# Patient Record
Sex: FEMALE | Race: WHITE | NOT HISPANIC OR LATINO | Employment: OTHER | URBAN - METROPOLITAN AREA
[De-identification: names, ages, dates, MRNs, and addresses within clinical notes are randomized per-mention and may not be internally consistent; named-entity substitution may affect disease eponyms.]

---

## 2017-04-10 ENCOUNTER — TRANSCRIBE ORDERS (OUTPATIENT)
Dept: ADMINISTRATIVE | Facility: HOSPITAL | Age: 62
End: 2017-04-10

## 2017-04-10 ENCOUNTER — TRANSCRIBE ORDERS (OUTPATIENT)
Dept: ONCOLOGY | Facility: CLINIC | Age: 62
End: 2017-04-10

## 2017-04-10 DIAGNOSIS — Z12.31 VISIT FOR SCREENING MAMMOGRAM: Primary | ICD-10-CM

## 2017-04-25 ENCOUNTER — OFFICE VISIT (OUTPATIENT)
Dept: ONCOLOGY | Facility: CLINIC | Age: 62
End: 2017-04-25

## 2017-04-25 VITALS
HEART RATE: 83 BPM | HEIGHT: 64 IN | RESPIRATION RATE: 16 BRPM | DIASTOLIC BLOOD PRESSURE: 71 MMHG | BODY MASS INDEX: 37.05 KG/M2 | TEMPERATURE: 97.7 F | SYSTOLIC BLOOD PRESSURE: 147 MMHG | WEIGHT: 217 LBS

## 2017-04-25 DIAGNOSIS — C50.911 MALIGNANT NEOPLASM OF RIGHT FEMALE BREAST, UNSPECIFIED SITE OF BREAST: Primary | ICD-10-CM

## 2017-04-25 PROCEDURE — 99213 OFFICE O/P EST LOW 20 MIN: CPT | Performed by: INTERNAL MEDICINE

## 2017-04-25 RX ORDER — TERBINAFINE HYDROCHLORIDE 250 MG/1
250 TABLET ORAL DAILY
COMMUNITY
Start: 2017-02-24

## 2017-04-25 NOTE — PROGRESS NOTES
"      PROBLEM LIST:  1. Stage IIA ([id number]) high-grade invasive ductal carcinoma of the right  breast, estrogen receptor negative, progesterone receptor weakly positive,  HER-2 negative:  a) The patient presented with a palpable mass in the right breast in the upper  inner quadrant. She had previously been getting yearly mammograms but the tumor  was in a location that may have been missed by screening. On 10/22/2013 she had  a biopsy that showed a high-grade invasive ductal carcinoma that was estrogen  receptor negative and HER-2 negative with weak progesterone receptor positivity  in about 10% of cells.   b) On 11/27/2013 she had a right lumpectomy with sentinel lymph node biopsy by  Dr. Rashawn Ochoa. Pathology showed a 3.5 cm high-grade cancer with negative  margins. 0 of 3 sentinel lymph nodes had evidence of disease.   c) The patient completed 4 cycles of docetaxel and cyclophosphamide with the  last dose on 03/05/2014.   She tolerated the treatment with minimal side effects.  d) Adjuvant endocrine therapy was recommended but the patient declined this.   2. Hypertension - on lisinopril.     Subjective     HISTORY OF PRESENT ILLNESS:   Emily Mccullough returns for follow-up.   She says she is doing well.  Her knee pain is unchanged.  She had her mammogram in December and is scheduled for a repeat in July.  No complaints about her energy level.    Past Medical History, Past Surgical History, Social History, Family History have been reviewed and are without significant changes except as mentioned.    Review of Systems   A comprehensive 14 point review of systems was performed and was negative except as mentioned.    Medications:  The current medication list was reviewed in the EMR    ALLERGIES:  No Known Allergies    Objective      /71  Pulse 83  Temp 97.7 °F (36.5 °C) (Temporal Artery )   Resp 16  Ht 64\" (162.6 cm)  Wt 217 lb (98.4 kg)  LMP  (LMP Unknown)  BMI 37.25 kg/m2     Performance Status: " 0    General: well appearing female in no acute distress  Neuro: alert and oriented  HEENT: sclera anicteric, oropharynx clear  Lymphatics: no cervical, supraclavicular, or axillary adenopathy  Breast: The left breast is unremarkable. in the right breast she has a well-healed scar with scar tissue that measures about 5 cm across, unchanged  Cardiovascular: regular rate and rhythm, no murmurs  Lungs: clear to auscultation bilaterally  Abdomen: soft, nontender, nondistended.  No palpable organomegaly  Extremeties: no lower extremity edema  Skin: no rashes, lesions, bruising, or petechiae  Psych: mood and affect appropriate      Imaging: mammogram in July 2016 showed stable changes.      Assessment/Plan   Emily Mccullough is a 61 y.o. year old female with a history of stage II a ER negative, [place] positive, HER-2 negative breast cancer who is now over 2 years out from completion of treatment.  She is doing well without evidence of recurrence.  She is now about 3 years out from finishing her treatment.  We discussed that for her tumor I would expect that the highest risk of recurrence is in the first 5 years.  After that I will plan to follow her yearly.                  Visit time was 15 minutes, greater than 50% spent in counseling      Darcie Bai MD  [facility]    4/25/2017          CC:

## 2017-07-06 ENCOUNTER — HOSPITAL ENCOUNTER (OUTPATIENT)
Dept: MAMMOGRAPHY | Facility: HOSPITAL | Age: 62
Discharge: HOME | End: 2017-07-06
Attending: INTERNAL MEDICINE | Admitting: INTERNAL MEDICINE

## 2017-07-06 DIAGNOSIS — Z12.31 VISIT FOR SCREENING MAMMOGRAM: ICD-10-CM

## 2017-07-06 PROCEDURE — 77063 BREAST TOMOSYNTHESIS BI: CPT

## 2017-07-06 PROCEDURE — 77063 BREAST TOMOSYNTHESIS BI: CPT | Performed by: RADIOLOGY

## 2017-07-06 PROCEDURE — 77067 SCR MAMMO BI INCL CAD: CPT | Performed by: RADIOLOGY

## 2017-07-06 PROCEDURE — G0202 SCR MAMMO BI INCL CAD: HCPCS

## 2017-10-24 ENCOUNTER — OFFICE VISIT (OUTPATIENT)
Dept: ONCOLOGY | Facility: CLINIC | Age: 62
End: 2017-10-24

## 2017-10-24 VITALS
RESPIRATION RATE: 16 BRPM | HEART RATE: 87 BPM | HEIGHT: 64 IN | WEIGHT: 204 LBS | TEMPERATURE: 97 F | SYSTOLIC BLOOD PRESSURE: 135 MMHG | BODY MASS INDEX: 34.83 KG/M2 | DIASTOLIC BLOOD PRESSURE: 65 MMHG

## 2017-10-24 DIAGNOSIS — C50.911 MALIGNANT NEOPLASM OF RIGHT BREAST IN FEMALE, ESTROGEN RECEPTOR NEGATIVE, UNSPECIFIED SITE OF BREAST (HCC): Primary | ICD-10-CM

## 2017-10-24 DIAGNOSIS — Z17.1 MALIGNANT NEOPLASM OF RIGHT BREAST IN FEMALE, ESTROGEN RECEPTOR NEGATIVE, UNSPECIFIED SITE OF BREAST (HCC): Primary | ICD-10-CM

## 2017-10-24 PROCEDURE — 99213 OFFICE O/P EST LOW 20 MIN: CPT | Performed by: INTERNAL MEDICINE

## 2017-10-24 NOTE — PROGRESS NOTES
"      PROBLEM LIST:  1. Stage IIA ([id number]) high-grade invasive ductal carcinoma of the right  breast, estrogen receptor negative, progesterone receptor weakly positive,  HER-2 negative:  a) The patient presented with a palpable mass in the right breast in the upper  inner quadrant. She had previously been getting yearly mammograms but the tumor  was in a location that may have been missed by screening. On 10/22/2013 she had  a biopsy that showed a high-grade invasive ductal carcinoma that was estrogen  receptor negative and HER-2 negative with weak progesterone receptor positivity  in about 10% of cells.   b) On 11/27/2013 she had a right lumpectomy with sentinel lymph node biopsy by  Dr. Rashawn Ochoa. Pathology showed a 3.5 cm high-grade cancer with negative  margins. 0 of 3 sentinel lymph nodes had evidence of disease.   c) The patient completed 4 cycles of docetaxel and cyclophosphamide with the  last dose on 03/05/2014.   She tolerated the treatment with minimal side effects.  d) Adjuvant endocrine therapy was recommended but the patient declined this.   2. Hypertension - on lisinopril.     Subjective     HISTORY OF PRESENT ILLNESS:   Emily Mccullough returns for follow-up.   She retired this summer.  Since then she has lost about 20 pounds.  Her energy level is great and she is going to the gym for about 2 hours every day.  She says she just decided she needed to take better care of herself.    Past Medical History, Past Surgical History, Social History, Family History have been reviewed and are without significant changes except as mentioned.    Review of Systems   A comprehensive 14 point review of systems was performed and was negative except as mentioned.    Medications:  The current medication list was reviewed in the EMR    ALLERGIES:  No Known Allergies    Objective      /65 Comment: LUE  Pulse 87  Temp 97 °F (36.1 °C) (Temporal Artery )   Resp 16  Ht 64\" (162.6 cm)  Wt 204 lb (92.5 kg)  LMP  " (LMP Unknown) Comment: 2013  BMI 35.02 kg/m2     Performance Status: 0    General: well appearing female in no acute distress  Neuro: alert and oriented  HEENT: sclera anicteric, oropharynx clear  Lymphatics: no cervical, supraclavicular, or axillary adenopathy  Breast: The left breast is unremarkable. in the right breast she has a well-healed scar with scar tissue that has softened considerably.  No palpable mass  Cardiovascular: regular rate and rhythm, no murmurs  Lungs: clear to auscultation bilaterally  Abdomen: soft, nontender, nondistended.  No palpable organomegaly  Extremeties: no lower extremity edema  Skin: no rashes, lesions, bruising, or petechiae  Psych: mood and affect appropriate      Imaging: mammogram in July 2017 showed stable changes.  MRI was recommended due to the difficult to image area of scar as well as high risk screening indication      Assessment/Plan   Emily Mccullough is a 62 y.o. year old female with a history of stage II a ER negative, [place] positive, HER-2 negative breast cancer who returns for follow up.  She is doing well without evidence of recurrence, now 4 years from her diagnosis.    We discussed MRI imaging of the breast which she is agreeable to.  We will schedule this for January about 6 months from her mammogram.    I'll plan to see her back again in 6 months.                Visit time was 15 minutes, greater than 50% spent in counseling      Darcie Bai MD  [facility] Hematology and Oncology    10/24/2017          CC:

## 2018-01-19 ENCOUNTER — HOSPITAL ENCOUNTER (OUTPATIENT)
Dept: MRI IMAGING | Facility: HOSPITAL | Age: 63
Discharge: HOME | End: 2018-01-19
Attending: INTERNAL MEDICINE | Admitting: INTERNAL MEDICINE

## 2018-01-19 DIAGNOSIS — Z17.1 MALIGNANT NEOPLASM OF RIGHT BREAST IN FEMALE, ESTROGEN RECEPTOR NEGATIVE, UNSPECIFIED SITE OF BREAST (HCC): ICD-10-CM

## 2018-01-19 DIAGNOSIS — C50.911 MALIGNANT NEOPLASM OF RIGHT BREAST IN FEMALE, ESTROGEN RECEPTOR NEGATIVE, UNSPECIFIED SITE OF BREAST (HCC): ICD-10-CM

## 2018-01-19 LAB — CREAT BLDA-MCNC: 0.7 MG/DL (ref 0.6–1.3)

## 2018-01-19 PROCEDURE — A9577 INJ MULTIHANCE: HCPCS | Performed by: INTERNAL MEDICINE

## 2018-01-19 PROCEDURE — 0159T MRI BREAST BILATERAL W WO CONTRAST: CPT | Performed by: RADIOLOGY

## 2018-01-19 PROCEDURE — 0159T HC MRI BREAST COMPUTER ANALYSIS: CPT

## 2018-01-19 PROCEDURE — 0 GADOBENATE DIMEGLUMINE 529 MG/ML SOLUTION: Performed by: INTERNAL MEDICINE

## 2018-01-19 PROCEDURE — 82565 ASSAY OF CREATININE: CPT

## 2018-01-19 PROCEDURE — C8908 MRI W/O FOL W/CONT, BREAST,: HCPCS

## 2018-01-19 PROCEDURE — 77059 MRI BREAST BILATERAL W WO CONTRAST: CPT | Performed by: RADIOLOGY

## 2018-01-19 RX ADMIN — GADOBENATE DIMEGLUMINE 19 ML: 529 INJECTION, SOLUTION INTRAVENOUS at 12:00

## 2018-01-23 ENCOUNTER — TELEPHONE (OUTPATIENT)
Dept: MRI IMAGING | Facility: HOSPITAL | Age: 63
End: 2018-01-23

## 2018-01-23 NOTE — TELEPHONE ENCOUNTER
Called pt with MRI results.  Pt is being recalled for a 2nd look US scheduled for 2/1/18 @ 10:00 to arrive 20 minutes early. She was concerned that it is so far away and I told her if we could reschedule to a closer date, I would call her. Pt expressed verbal understanding and will call with any further questions or concerns.  An E-Mail was sent to Dr. Yin to see if she had any earlier appointments available.

## 2018-01-29 ENCOUNTER — HOSPITAL ENCOUNTER (OUTPATIENT)
Dept: ULTRASOUND IMAGING | Facility: HOSPITAL | Age: 63
Discharge: HOME | End: 2018-01-29
Attending: INTERNAL MEDICINE | Admitting: INTERNAL MEDICINE

## 2018-01-29 ENCOUNTER — HOSPITAL ENCOUNTER (OUTPATIENT)
Dept: MAMMOGRAPHY | Facility: HOSPITAL | Age: 63
Discharge: HOME | End: 2018-01-29

## 2018-01-29 ENCOUNTER — HOSPITAL ENCOUNTER (OUTPATIENT)
Dept: ULTRASOUND IMAGING | Facility: HOSPITAL | Age: 63
Discharge: HOME | End: 2018-01-29

## 2018-01-29 DIAGNOSIS — R92.8 ABNORMAL FINDINGS ON DIAGNOSTIC IMAGING OF BREAST: ICD-10-CM

## 2018-01-29 DIAGNOSIS — R92.8 ABNORMAL MAMMOGRAM: ICD-10-CM

## 2018-01-29 PROCEDURE — A4648 IMPLANTABLE TISSUE MARKER: HCPCS

## 2018-01-29 PROCEDURE — 88173 CYTOPATH EVAL FNA REPORT: CPT | Performed by: INTERNAL MEDICINE

## 2018-01-29 PROCEDURE — 10022 US GUIDED FINE NEEDLE ASPIRATION: CPT | Performed by: RADIOLOGY

## 2018-01-29 PROCEDURE — 88305 TISSUE EXAM BY PATHOLOGIST: CPT | Performed by: INTERNAL MEDICINE

## 2018-01-29 PROCEDURE — 76942 ECHO GUIDE FOR BIOPSY: CPT | Performed by: RADIOLOGY

## 2018-01-29 PROCEDURE — 76942 ECHO GUIDE FOR BIOPSY: CPT

## 2018-01-29 PROCEDURE — 77065 DX MAMMO INCL CAD UNI: CPT | Performed by: RADIOLOGY

## 2018-01-29 PROCEDURE — 19083 BX BREAST 1ST LESION US IMAG: CPT | Performed by: RADIOLOGY

## 2018-01-29 RX ORDER — LIDOCAINE HYDROCHLORIDE 10 MG/ML
5 INJECTION, SOLUTION INFILTRATION; PERINEURAL ONCE
Status: COMPLETED | OUTPATIENT
Start: 2018-01-29 | End: 2018-01-29

## 2018-01-29 RX ORDER — LIDOCAINE HYDROCHLORIDE AND EPINEPHRINE 10; 10 MG/ML; UG/ML
10 INJECTION, SOLUTION INFILTRATION; PERINEURAL ONCE
Status: COMPLETED | OUTPATIENT
Start: 2018-01-29 | End: 2018-01-29

## 2018-01-29 RX ORDER — LIDOCAINE HYDROCHLORIDE AND EPINEPHRINE 10; 10 MG/ML; UG/ML
10 INJECTION, SOLUTION INFILTRATION; PERINEURAL ONCE
Status: SHIPPED | OUTPATIENT
Start: 2018-01-29

## 2018-01-29 RX ADMIN — LIDOCAINE HYDROCHLORIDE,EPINEPHRINE BITARTRATE 3 ML: 10; .01 INJECTION, SOLUTION INFILTRATION; PERINEURAL at 12:32

## 2018-01-29 RX ADMIN — LIDOCAINE HYDROCHLORIDE 5 ML: 10 INJECTION, SOLUTION INFILTRATION; PERINEURAL at 12:32

## 2018-01-29 RX ADMIN — LIDOCAINE HYDROCHLORIDE 5 ML: 10 INJECTION, SOLUTION INFILTRATION; PERINEURAL at 12:31

## 2018-01-29 NOTE — PROGRESS NOTES
Alert and orientated. Denies discomfort. No active bleeding. Steri-strips & gauze dressing applied.  Ice packs given. Verbalizes and demonstrates understanding of post-care instructions and written copy given.

## 2018-01-31 ENCOUNTER — APPOINTMENT (OUTPATIENT)
Dept: ULTRASOUND IMAGING | Facility: HOSPITAL | Age: 63
End: 2018-01-31
Attending: INTERNAL MEDICINE

## 2018-01-31 LAB
LAB AP CASE REPORT: NORMAL
Lab: NORMAL
PATH REPORT.FINAL DX SPEC: NORMAL

## 2018-02-01 ENCOUNTER — HOSPITAL ENCOUNTER (OUTPATIENT)
Dept: ULTRASOUND IMAGING | Facility: HOSPITAL | Age: 63
End: 2018-02-01
Attending: INTERNAL MEDICINE

## 2018-02-05 ENCOUNTER — TELEPHONE (OUTPATIENT)
Dept: MAMMOGRAPHY | Facility: HOSPITAL | Age: 63
End: 2018-02-05

## 2018-02-05 NOTE — TELEPHONE ENCOUNTER
02.05.18 @ 1445: Pt notified of pathology results and recommendations. Verbalizes understanding. Denies discomfort. Denies any signs and symptoms of infection.  At Dr NASIM Yin's request, the  importance of getting the MRI was stressed as it is the only way to see the lumpectomy bed. Verbalizes understanding.

## 2018-02-22 LAB
LAB AP CASE REPORT: NORMAL
LAB AP CLINICAL INFORMATION: NORMAL
LAB AP DIAGNOSIS COMMENT: NORMAL
Lab: NORMAL
PATH REPORT.ADDENDUM SPEC: NORMAL
PATH REPORT.FINAL DX SPEC: NORMAL
PATH REPORT.GROSS SPEC: NORMAL

## 2018-05-25 NOTE — PROGRESS NOTES
PROBLEM LIST:  1. Stage IIA ([id number]) high-grade invasive ductal carcinoma of the right  breast, estrogen receptor negative, progesterone receptor weakly positive,  HER-2 negative:  a) The patient presented with a palpable mass in the right breast in the upper  inner quadrant. She had previously been getting yearly mammograms but the tumor  was in a location that may have been missed by screening. On 10/22/2013 she had  a biopsy that showed a high-grade invasive ductal carcinoma that was estrogen  receptor negative and HER-2 negative with weak progesterone receptor positivity  in about 10% of cells.   b) On 11/27/2013 she had a right lumpectomy with sentinel lymph node biopsy by  Dr. Rashawn Ochoa. Pathology showed a 3.5 cm high-grade cancer with negative  margins. 0 of 3 sentinel lymph nodes had evidence of disease.   c) The patient completed 4 cycles of docetaxel and cyclophosphamide with the  last dose on 03/05/2014.   She tolerated the treatment with minimal side effects.  d) Adjuvant endocrine therapy was recommended but the patient declined this.   2. Hypertension - on lisinopril.     Subjective     HISTORY OF PRESENT ILLNESS:   Emily Mccullough returns for follow-up.   In January she had a breast MRI which was followed by a biopsy.   This turned out to be fat necrosis.  The whole experience was very troubling for her.  She was concerned that her mammograms stable for almost 5 years now, and an MRI seemed to be a regression.  She is not sure she wants to continue doing screening MRIs, at least not every year.  Otherwise she is feeling well.  She has been able to keep off much of the weight that she has lost and no longer has a diagnosis of diabetes.  She traveled to [place] to visit her family and friends earlier this year.    Past Medical History, Past Surgical History, Social History, Family History have been reviewed and are without significant changes except as mentioned.    Review of Systems   A  "comprehensive 14 point review of systems was performed and was negative except as mentioned.    Medications:  The current medication list was reviewed in the EMR    ALLERGIES:  No Known Allergies    Objective      /65   Pulse 73   Temp 98.5 °F (36.9 °C) (Temporal Artery )   Resp 16   Ht 162.6 cm (64.02\")   Wt 88 kg (194 lb)   LMP  (LMP Unknown) Comment: 2013  SpO2 99%   BMI 33.28 kg/m²      Performance Status: 0    General: well appearing female in no acute distress  Neuro: alert and oriented  HEENT: sclera anicteric, oropharynx clear  Lymphatics: no cervical, supraclavicular, or axillary adenopathy  Breast: The left breast is unremarkable. in the right breast she has a well-healed scar with scar tissue.  No palpable mass  Cardiovascular: regular rate and rhythm, no murmurs  Lungs: clear to auscultation bilaterally  Abdomen: soft, nontender, nondistended.  No palpable organomegaly  Extremeties: no lower extremity edema  Skin: no rashes, lesions, bruising, or petechiae  Psych: mood and affect appropriate            Assessment/Plan   Emily Mccullough is a 62 y.o. year old female with a history of stage II a ER negative, ID positive, HER-2 negative breast cancer who returns for follow up.  She continues to do well without evidence of cancer recurrence.    We discussed that we had ordered the MRI last year because the radiologist who read her last mammogram suggested it given the posterior location of her lumpectomy bed.  The MRI can provide better imaging of the chest wall.  We also discussed that the first MRI is likely to show things that can lead to biopsy, simply because there are no previous comparisons.  However if she is more comfortable continuing with mammogram screening only adding this is reasonable.  She says she will plan to do another mammogram before the end of the year but does not plan to do an MRI for now.    I'll plan to see her back again in 6 months.  At that point she will be about 5 " years from her diagnosis.  We will discuss the option of continuing follow-up with her primary care only.                Visit time was 25 minutes, greater than 50% spent in counseling      Darcie Bai MD  [facility] Hematology and Oncology    5/29/2018          CC:

## 2018-05-29 ENCOUNTER — OFFICE VISIT (OUTPATIENT)
Dept: ONCOLOGY | Facility: CLINIC | Age: 63
End: 2018-05-29

## 2018-05-29 VITALS
HEIGHT: 64 IN | TEMPERATURE: 98.5 F | DIASTOLIC BLOOD PRESSURE: 65 MMHG | SYSTOLIC BLOOD PRESSURE: 136 MMHG | HEART RATE: 73 BPM | RESPIRATION RATE: 16 BRPM | BODY MASS INDEX: 33.12 KG/M2 | OXYGEN SATURATION: 99 % | WEIGHT: 194 LBS

## 2018-05-29 DIAGNOSIS — C50.911 MALIGNANT NEOPLASM OF RIGHT BREAST IN FEMALE, ESTROGEN RECEPTOR NEGATIVE, UNSPECIFIED SITE OF BREAST (HCC): Primary | ICD-10-CM

## 2018-05-29 DIAGNOSIS — Z17.1 MALIGNANT NEOPLASM OF RIGHT BREAST IN FEMALE, ESTROGEN RECEPTOR NEGATIVE, UNSPECIFIED SITE OF BREAST (HCC): Primary | ICD-10-CM

## 2018-05-29 PROCEDURE — 99214 OFFICE O/P EST MOD 30 MIN: CPT | Performed by: INTERNAL MEDICINE

## 2018-06-12 ENCOUNTER — TRANSCRIBE ORDERS (OUTPATIENT)
Dept: ADMINISTRATIVE | Facility: HOSPITAL | Age: 63
End: 2018-06-12

## 2018-06-12 DIAGNOSIS — R92.8 ABNORMAL MAMMOGRAM: Primary | ICD-10-CM

## 2018-11-27 ENCOUNTER — HOSPITAL ENCOUNTER (OUTPATIENT)
Dept: MAMMOGRAPHY | Facility: HOSPITAL | Age: 63
Discharge: HOME | End: 2018-11-27
Attending: INTERNAL MEDICINE | Admitting: INTERNAL MEDICINE

## 2018-11-27 DIAGNOSIS — R92.8 ABNORMAL MAMMOGRAM: ICD-10-CM

## 2018-11-27 PROCEDURE — 77066 DX MAMMO INCL CAD BI: CPT | Performed by: RADIOLOGY

## 2018-11-27 PROCEDURE — 77066 DX MAMMO INCL CAD BI: CPT

## 2018-11-27 PROCEDURE — G0279 TOMOSYNTHESIS, MAMMO: HCPCS

## 2018-11-27 PROCEDURE — 77062 BREAST TOMOSYNTHESIS BI: CPT | Performed by: RADIOLOGY

## 2018-12-04 ENCOUNTER — OFFICE VISIT (OUTPATIENT)
Dept: ONCOLOGY | Facility: CLINIC | Age: 63
End: 2018-12-04

## 2018-12-04 VITALS
OXYGEN SATURATION: 98 % | BODY MASS INDEX: 35.85 KG/M2 | SYSTOLIC BLOOD PRESSURE: 133 MMHG | HEART RATE: 88 BPM | WEIGHT: 210 LBS | HEIGHT: 64 IN | DIASTOLIC BLOOD PRESSURE: 60 MMHG | TEMPERATURE: 97.3 F | RESPIRATION RATE: 16 BRPM

## 2018-12-04 DIAGNOSIS — C50.911 MALIGNANT NEOPLASM OF RIGHT BREAST IN FEMALE, ESTROGEN RECEPTOR NEGATIVE, UNSPECIFIED SITE OF BREAST (HCC): Primary | ICD-10-CM

## 2018-12-04 DIAGNOSIS — Z17.1 MALIGNANT NEOPLASM OF RIGHT BREAST IN FEMALE, ESTROGEN RECEPTOR NEGATIVE, UNSPECIFIED SITE OF BREAST (HCC): Primary | ICD-10-CM

## 2018-12-04 PROCEDURE — 99213 OFFICE O/P EST LOW 20 MIN: CPT | Performed by: INTERNAL MEDICINE

## 2018-12-04 NOTE — PROGRESS NOTES
"      PROBLEM LIST:  1. Stage IIA ([id number]) high-grade invasive ductal carcinoma of the right  breast, estrogen receptor negative, progesterone receptor weakly positive,  HER-2 negative:  a) The patient presented with a palpable mass in the right breast in the upper  inner quadrant. She had previously been getting yearly mammograms but the tumor  was in a location that may have been missed by screening. On 10/22/2013 she had  a biopsy that showed a high-grade invasive ductal carcinoma that was estrogen  receptor negative and HER-2 negative with weak progesterone receptor positivity  in about 10% of cells.   b) On 11/27/2013 she had a right lumpectomy with sentinel lymph node biopsy by  Dr. Rashawn Ochoa. Pathology showed a 3.5 cm high-grade cancer with negative  margins. 0 of 3 sentinel lymph nodes had evidence of disease.   c) The patient completed 4 cycles of docetaxel and cyclophosphamide with the  last dose on 03/05/2014.   She tolerated the treatment with minimal side effects.  d) Adjuvant endocrine therapy was recommended but the patient declined this.   2. Hypertension - on lisinopril.     Subjective     HISTORY OF PRESENT ILLNESS:   Emily Mccullough returns for follow-up.   She had a mammogram in November which was stable - one year follow-up was recommended.  She says she's been feeling well.  Her energy level is good.  She has no new complaints today.    Past Medical History, Past Surgical History, Social History, Family History have been reviewed and are without significant changes except as mentioned.    Review of Systems   A comprehensive 14 point review of systems was performed and was negative except as mentioned.    Medications:  The current medication list was reviewed in the EMR    ALLERGIES:  No Known Allergies    Objective      /60 Comment: LUE  Pulse 88   Temp 97.3 °F (36.3 °C) (Temporal)   Resp 16   Ht 162.6 cm (64\")   Wt 95.3 kg (210 lb)   LMP  (LMP Unknown) Comment: 2013  SpO2 98% " Comment: RA  BMI 36.05 kg/m²      Performance Status: 0    General: well appearing female in no acute distress  Neuro: alert and oriented  HEENT: sclera anicteric, oropharynx clear  Lymphatics: no cervical, supraclavicular, or axillary adenopathy  Breast: The left breast is unremarkable. in the right breast she has a well-healed scar with scar tissue.  No palpable mass  Cardiovascular: regular rate and rhythm, no murmurs  Lungs: clear to auscultation bilaterally  Abdomen: soft, nontender, nondistended.  No palpable organomegaly  Extremeties: no lower extremity edema  Skin: no rashes, lesions, bruising, or petechiae  Psych: mood and affect appropriate      Mammogram 11/27/18 - category 2.  6 mo f/u recommended.      Assessment/Plan   Emily Mccullough is a 63 y.o. year old female with a history of stage II a ER negative, [place] positive, HER-2 negative breast cancer who returns for follow up.  She continues to do well without evidence of cancer recurrence.  At this point she is over 5 years out from her diagnosis and surgery.  We discussed that for this type of breast cancer the likelihood of recurrence after 5 years is extremely small.  She has the option of following up with her primary care doctor going forward.  Of course I am available if anything ever does come up in the future.  I encouraged her to continue with her follow-up mammograms.  She is undecided about doing screening breast MRI.  I told her that there is no clear evidence that it reduces breast cancer mortality, but for dense breast tissue it can be helpful in getting a more clear image in combination with mammogram.  She will plan to follow-up with Dr. Mg in the future.                  Visit time was 15 minutes, greater than 50% spent in counseling on imaging results, prognosis, and follow-up plans.    Darcie Bai MD  [facility]ematology and Oncology    12/4/2018          CC:

## 2019-11-25 ENCOUNTER — TRANSCRIBE ORDERS (OUTPATIENT)
Dept: ADMINISTRATIVE | Facility: HOSPITAL | Age: 64
End: 2019-11-25

## 2019-11-25 DIAGNOSIS — Z12.31 VISIT FOR SCREENING MAMMOGRAM: Primary | ICD-10-CM

## 2020-02-05 ENCOUNTER — HOSPITAL ENCOUNTER (OUTPATIENT)
Dept: MAMMOGRAPHY | Facility: HOSPITAL | Age: 65
Discharge: HOME | End: 2020-02-05
Admitting: FAMILY MEDICINE

## 2020-02-05 DIAGNOSIS — Z12.31 VISIT FOR SCREENING MAMMOGRAM: ICD-10-CM

## 2020-02-05 PROCEDURE — 77063 BREAST TOMOSYNTHESIS BI: CPT

## 2020-02-05 PROCEDURE — 77067 SCR MAMMO BI INCL CAD: CPT | Performed by: RADIOLOGY

## 2020-02-05 PROCEDURE — 77063 BREAST TOMOSYNTHESIS BI: CPT | Performed by: RADIOLOGY

## 2020-02-05 PROCEDURE — 77067 SCR MAMMO BI INCL CAD: CPT

## 2020-09-15 ENCOUNTER — TRANSCRIBE ORDERS (OUTPATIENT)
Dept: ADMINISTRATIVE | Facility: HOSPITAL | Age: 65
End: 2020-09-15

## 2020-09-15 DIAGNOSIS — Z12.31 VISIT FOR SCREENING MAMMOGRAM: Primary | ICD-10-CM

## 2021-02-08 ENCOUNTER — HOSPITAL ENCOUNTER (OUTPATIENT)
Dept: MAMMOGRAPHY | Facility: HOSPITAL | Age: 66
Discharge: HOME | End: 2021-02-08
Admitting: FAMILY MEDICINE

## 2021-02-08 DIAGNOSIS — Z12.31 VISIT FOR SCREENING MAMMOGRAM: ICD-10-CM

## 2021-02-08 PROCEDURE — 77063 BREAST TOMOSYNTHESIS BI: CPT

## 2021-02-08 PROCEDURE — 77067 SCR MAMMO BI INCL CAD: CPT

## 2021-02-08 PROCEDURE — 77063 BREAST TOMOSYNTHESIS BI: CPT | Performed by: RADIOLOGY

## 2021-02-08 PROCEDURE — 77067 SCR MAMMO BI INCL CAD: CPT | Performed by: RADIOLOGY

## 2021-02-24 ENCOUNTER — HOSPITAL ENCOUNTER (OUTPATIENT)
Dept: MAMMOGRAPHY | Facility: HOSPITAL | Age: 66
Discharge: HOME | End: 2021-02-24

## 2021-02-24 DIAGNOSIS — Z12.31 VISIT FOR SCREENING MAMMOGRAM: ICD-10-CM

## 2021-03-02 ENCOUNTER — OFFICE VISIT (OUTPATIENT)
Dept: OBSTETRICS AND GYNECOLOGY | Facility: CLINIC | Age: 66
End: 2021-03-02

## 2021-03-02 VITALS
SYSTOLIC BLOOD PRESSURE: 148 MMHG | WEIGHT: 207 LBS | DIASTOLIC BLOOD PRESSURE: 76 MMHG | HEIGHT: 63 IN | BODY MASS INDEX: 36.68 KG/M2

## 2021-03-02 DIAGNOSIS — N95.1 MENOPAUSAL SYMPTOMS: ICD-10-CM

## 2021-03-02 DIAGNOSIS — Z17.1 MALIGNANT NEOPLASM OF RIGHT BREAST IN FEMALE, ESTROGEN RECEPTOR NEGATIVE, UNSPECIFIED SITE OF BREAST (HCC): ICD-10-CM

## 2021-03-02 DIAGNOSIS — Z01.419 PAP TEST, AS PART OF ROUTINE GYNECOLOGICAL EXAMINATION: Primary | ICD-10-CM

## 2021-03-02 DIAGNOSIS — C50.911 MALIGNANT NEOPLASM OF RIGHT BREAST IN FEMALE, ESTROGEN RECEPTOR NEGATIVE, UNSPECIFIED SITE OF BREAST (HCC): ICD-10-CM

## 2021-03-02 PROCEDURE — 99396 PREV VISIT EST AGE 40-64: CPT | Performed by: OBSTETRICS & GYNECOLOGY

## 2021-03-02 RX ORDER — TRIAMCINOLONE ACETONIDE 1 MG/G
CREAM TOPICAL 2 TIMES DAILY
COMMUNITY

## 2021-03-02 RX ORDER — ESCITALOPRAM OXALATE 5 MG/1
TABLET ORAL
COMMUNITY
Start: 2020-12-18

## 2021-03-02 NOTE — PROGRESS NOTES
GYN Annual Exam     CC - Here for annual exam.     Subjective   HPI  Emily Mccullough is a 65 y.o. female, , who presents for annual well woman exam.  She is postmenopausal. Her last LMP was No LMP recorded (lmp unknown). Patient is postmenopausal..  Periods are none, lasting 0 days.  Dysmenorrhea:none.  Patient reports problems with: none.  Partner Status: Marital Status: .    Desires STD Screening: YES/NO/Other: no.    Patients  passed away last summer.     Additional OB/GYN History   Current contraception: postmenopausal    Last Pap :   Last Completed Pap Smear       Status Date      PAP SMEAR Done 2/10/2020 negative        History of abnormal Pap smear: yes  Family history of uterine, colon, breast, or ovarian cancer: no  Last mammogram:   Last Completed Mammogram       Status Date      MAMMOGRAM Done 2021 MAMMO SCREENING TECHNICAL RECALL RIGHT     Patient has more history with this topic...        Last colonoscopy:   Last Completed Colonoscopy       Status Date      COLONOSCOPY No completions recorded        Last DEXA: none   Exercises Regularly: no  Feelings of Anxiety or Depression: yes    Tobacco Usage?:     Health Maintenance   Topic Date Due   • Annual Gynecologic Pelvic and Breast Exam  1955   • COLONOSCOPY  1957   • ANNUAL PHYSICAL  1960   • ZOSTER VACCINE (1 of 2) 2007   • HEPATITIS C SCREENING  2017   • INFLUENZA VACCINE  2020   • MAMMOGRAM  2022   • PAP SMEAR  02/10/2023   • TDAP/TD VACCINES (2 - Td) 2027   • Pneumococcal Vaccine 0-64  Aged Out   • MENINGOCOCCAL VACCINE  Aged Out       Current Outpatient Medications   Medication Sig Dispense Refill   • escitalopram (LEXAPRO) 5 MG tablet      • lisinopril-hydrochlorothiazide (PRINZIDE,ZESTORETIC) 20-12.5 MG per tablet Take 1 tablet by mouth Daily.     • metFORMIN XR (GLUCOPHAGE-XR) 500 MG 24 hr tablet Take 1 tablet by mouth 2 (Two) Times a Day.     • triamcinolone (KENALOG) 0.1 %  "cream Apply  topically to the appropriate area as directed 2 (Two) Times a Day.     • terbinafine (lamiSIL) 250 MG tablet Take 250 mg by mouth Daily.       Current Facility-Administered Medications   Medication Dose Route Frequency Provider Last Rate Last Admin   • lidocaine-EPINEPHrine (XYLOCAINE W/EPI) 1 %-1:[id number] injection 10 mL  10 mL Infiltration Once Darcie Bai MD           The additional following portions of the patient's history were reviewed and updated as appropriate: allergies, current medications, past family history, past medical history, past social history and past surgical history.    Review of Systems   Constitutional: Negative.    HENT: Negative.    Respiratory: Negative.    Cardiovascular: Negative.    Gastrointestinal: Negative.    Genitourinary: Negative.    Musculoskeletal: Negative.    Skin: Negative.    Allergic/Immunologic: Negative.    Neurological: Negative.    Hematological: Negative.    Psychiatric/Behavioral: Negative.        I have reviewed and agree with the HPI, ROS, and historical information as entered above. Yash Baker MD    Objective   /76   Ht 160 cm (63\")   Wt 93.9 kg (207 lb)   LMP  (LMP Unknown) Comment: 2013  BMI 36.67 kg/m²     PE    Breast: Without masses ,nontender, no skin changes or retractions  Axilla: Normal, no lymphadenopathy  Heart: Regular rate no murmurs rubs or gallops  Lungs: Clear to auscultation, normal breath sounds bilaterally  Abdomen: Soft nontender, no hepatosplenomegaly, no guarding or rebound, no masses  Pelvic exam  External genitalia: Normal introitus and vulva  Vagina: Normal mucosa no bleeding inflammation or discharge  Bladder: Normal position nontender  Urethral meatus and urethra: Normal nontender  Cervix: No lesions, no discharge, bleeding or inflammation  Bimanual: Nontender adnexa clear, no sign of uterine or ovarian enlargement  Anal: No external lesions or hemorrhoids    Rectovaginal: Declined by " patient    Assessment/Plan     Assessment     Problem List Items Addressed This Visit        Genitourinary and Reproductive     Menopausal symptoms       Hematology and Neoplasia    Breast cancer, right (CMS/HCC)    Overview                Other Visit Diagnoses     Pap test, as part of routine gynecological examination    -  Primary    Relevant Orders    Pap IG, Rfx HPV ASCU          1. GYN annual well woman exam.   2. Normal postmenopausal exam, patient's  passed away this year, patient had questions about possible HRT    Plan     1. Reviewed HPV guidelines  2. Reviewed monthly self breast exams.  Instructed to call with lumps, pain, or breast discharge.  Yearly mammograms ordered.  3. Recommended use of Vitamin D and getting adequate calcium in her diet. (1500mg)  4. Activity recommends - Adult 150-300 min/week of multi-component physical activities that include balance training, aerobic and physical strengthening.  Disabled or ill adults should still try to fulfill these requirements, with modifications based on their conditions.  5. Colonoscopy recommended.  6. Anticipatory menopausal guidance given.  Healthy lifestyle changes including diet and exercise reviewed  Preventative health initiatives reviewed  Yash Baker MD  03/02/2021

## 2021-03-11 DIAGNOSIS — Z01.419 PAP TEST, AS PART OF ROUTINE GYNECOLOGICAL EXAMINATION: ICD-10-CM

## 2021-03-18 ENCOUNTER — TELEPHONE (OUTPATIENT)
Dept: OBSTETRICS AND GYNECOLOGY | Facility: CLINIC | Age: 66
End: 2021-03-18

## 2021-12-21 ENCOUNTER — TRANSCRIBE ORDERS (OUTPATIENT)
Dept: ADMINISTRATIVE | Facility: HOSPITAL | Age: 66
End: 2021-12-21

## 2021-12-21 DIAGNOSIS — Z12.31 VISIT FOR SCREENING MAMMOGRAM: Primary | ICD-10-CM

## 2022-02-25 ENCOUNTER — HOSPITAL ENCOUNTER (OUTPATIENT)
Dept: MAMMOGRAPHY | Facility: HOSPITAL | Age: 67
Discharge: HOME | End: 2022-02-25
Admitting: FAMILY MEDICINE

## 2022-02-25 DIAGNOSIS — Z12.31 VISIT FOR SCREENING MAMMOGRAM: ICD-10-CM

## 2022-02-25 PROCEDURE — 77067 SCR MAMMO BI INCL CAD: CPT | Performed by: RADIOLOGY

## 2022-02-25 PROCEDURE — 77063 BREAST TOMOSYNTHESIS BI: CPT

## 2022-02-25 PROCEDURE — 77067 SCR MAMMO BI INCL CAD: CPT

## 2022-02-25 PROCEDURE — 77063 BREAST TOMOSYNTHESIS BI: CPT | Performed by: RADIOLOGY

## 2022-04-06 ENCOUNTER — HOSPITAL ENCOUNTER (OUTPATIENT)
Dept: ULTRASOUND IMAGING | Facility: HOSPITAL | Age: 67
Discharge: HOME | End: 2022-04-06

## 2022-04-06 ENCOUNTER — HOSPITAL ENCOUNTER (OUTPATIENT)
Dept: MAMMOGRAPHY | Facility: HOSPITAL | Age: 67
Discharge: HOME | End: 2022-04-06

## 2022-04-06 DIAGNOSIS — R92.8 ABNORMAL MAMMOGRAM: ICD-10-CM

## 2022-04-06 PROCEDURE — G0279 TOMOSYNTHESIS, MAMMO: HCPCS

## 2022-04-06 PROCEDURE — 77061 BREAST TOMOSYNTHESIS UNI: CPT | Performed by: RADIOLOGY

## 2022-04-06 PROCEDURE — 77065 DX MAMMO INCL CAD UNI: CPT | Performed by: RADIOLOGY

## 2022-04-06 PROCEDURE — 77065 DX MAMMO INCL CAD UNI: CPT

## 2022-04-06 PROCEDURE — 76642 ULTRASOUND BREAST LIMITED: CPT | Performed by: RADIOLOGY

## 2022-04-06 PROCEDURE — 76642 ULTRASOUND BREAST LIMITED: CPT

## 2023-03-06 ENCOUNTER — TRANSCRIBE ORDERS (OUTPATIENT)
Dept: ADMINISTRATIVE | Facility: HOSPITAL | Age: 68
End: 2023-03-06
Payer: MEDICARE

## 2023-03-06 DIAGNOSIS — Z12.31 ENCOUNTER FOR MAMMOGRAM TO ESTABLISH BASELINE MAMMOGRAM: Primary | ICD-10-CM

## 2023-04-12 ENCOUNTER — HOSPITAL ENCOUNTER (OUTPATIENT)
Dept: MAMMOGRAPHY | Facility: HOSPITAL | Age: 68
Discharge: HOME | End: 2023-04-12
Admitting: OBSTETRICS & GYNECOLOGY
Payer: MEDICARE

## 2023-04-12 DIAGNOSIS — Z12.31 ENCOUNTER FOR MAMMOGRAM TO ESTABLISH BASELINE MAMMOGRAM: ICD-10-CM

## 2023-04-12 PROCEDURE — 77067 SCR MAMMO BI INCL CAD: CPT

## 2023-04-12 PROCEDURE — 77063 BREAST TOMOSYNTHESIS BI: CPT

## 2023-05-02 ENCOUNTER — OFFICE VISIT (OUTPATIENT)
Dept: OBSTETRICS AND GYNECOLOGY | Facility: CLINIC | Age: 68
End: 2023-05-02
Payer: MEDICARE

## 2023-05-02 VITALS
BODY MASS INDEX: 38.09 KG/M2 | WEIGHT: 215 LBS | HEIGHT: 63 IN | SYSTOLIC BLOOD PRESSURE: 120 MMHG | DIASTOLIC BLOOD PRESSURE: 60 MMHG

## 2023-05-02 DIAGNOSIS — Z85.3 PERSONAL HISTORY OF BREAST CANCER: Primary | ICD-10-CM

## 2023-05-02 DIAGNOSIS — Z01.419 WELL WOMAN EXAM WITH ROUTINE GYNECOLOGICAL EXAM: ICD-10-CM

## 2023-05-02 NOTE — PROGRESS NOTES
Gynecologic Annual Exam Note        GYN Annual Exam     CC - Here for annual exam.     Subjective     HPI  Emily Mccullough is a 67 y.o. female, , who presents for annual well woman exam as a(n) established patient of practice who is new to me.  She is postmenopausal.  Patient denies vaginal bleeding.    There were no changes to her medical or surgical history since her last visit. Partner Status: Marital Status: .  She is not currently sexually active. STD testing recommendations have been explained to the patient and she does not desire STD testing.    She underwent lumpectomy, radiation, and chemo for cancer in her (R) breast in 8525-9093. Patient c/o occasional stress incontinence.         Additional OB/GYN History     On HRT? No    Last Pap : 3/21. Results: ASCUS. HPV: negative.     Last Completed Pap Smear          Ordered - PAP SMEAR (Every 3 Years) Ordered on 2021  SCANNED - PAP SMEAR    02/10/2020  Done - negative              History of abnormal Pap smear: yes - h/o LEEP ( approx. Per pt and nml paps since then)  Family history of uterine, colon, breast, or ovarian cancer: no  Performs monthly Self-Breast Exam: yes  Last mammogram: 2023. Done at [facility] and additional views are scheduled for evaluation of her (L) breast .    Last Completed Mammogram          Scheduled - MAMMOGRAM (Yearly) Scheduled for 2023  Mammo Screening Digital Tomosynthesis Bilateral With CAD    2022  Mammo Diagnostic Digital Tomosynthesis Left With CAD    2022  Mammo Screening Digital Tomosynthesis Bilateral With CAD    2021  Mammo Screening Technical Recall Right    2021  Mammo Screening Digital Tomosynthesis Bilateral With CAD    Only the first 5 history entries have been loaded, but more history exists.              Last colonoscopy: has had a colonoscopy 1 month(s) ago. (and was wnl)     Last Completed Colonoscopy     This patient has no  relevant Health Maintenance data.        Last DEXA: never and declines   Exercises Regularly: no  Feelings of Anxiety or Depression: yes - anxiety; she is weaning off Lexapro.       Tobacco Usage?: No       Current Outpatient Medications:   •  lisinopril-hydrochlorothiazide (PRINZIDE,ZESTORETIC) 20-12.5 MG per tablet, Take 1 tablet by mouth Daily., Disp: , Rfl:   •  metFORMIN XR (GLUCOPHAGE-XR) 500 MG 24 hr tablet, Take 1 tablet by mouth 2 (Two) Times a Day., Disp: , Rfl:   •  triamcinolone (KENALOG) 0.1 % cream, Apply  topically to the appropriate area as directed 2 (Two) Times a Day., Disp: , Rfl:   •  escitalopram (LEXAPRO) 5 MG tablet, , Disp: , Rfl:     Current Facility-Administered Medications:   •  lidocaine-EPINEPHrine (XYLOCAINE W/EPI) 1 %-1:174505 injection 10 mL, 10 mL, Infiltration, Once, Darcie Bai MD    Patient denies the need for medication refills today.    OB History        2    Para   1    Term   1            AB   1    Living   1       SAB   1    IAB        Ectopic        Molar        Multiple        Live Births   1                Past Medical History:   Diagnosis Date   • Abnormal Pap smear of cervix    • Anxiety 2020    After my ’s death   • Breast cancer, right    • Diabetes mellitus     Prediabetes   • Drug therapy 6618-9107    Right Breast Cancer   • Hx of radiation therapy 8585-8303    Right Breast Cancer   • Hypertension    • Prediabetes    • Radiation    • Varicella         Past Surgical History:   Procedure Laterality Date   • BREAST BIOPSY Right    • BREAST BIOPSY Right 2018    us core   • BREAST BIOPSY Right 2018    us fna   • BREAST LUMPECTOMY Right    •  SECTION     • D & C WITH SUCTION     • DILATATION AND CURETTAGE      with hysteroscopy PMB   • KNEE SURGERY     • LEEP     • MOLE REMOVAL         Health Maintenance   Topic Date Due   • DXA SCAN  Never done   • COLORECTAL CANCER SCREENING  Never done   • HEPATITIS C SCREENING   "Never done   • ANNUAL WELLNESS VISIT  Never done   • COVID-19 Vaccine (4 - Booster for Moderna series) 02/16/2022   • Pneumococcal Vaccine 65+ (1 - PCV) Never done   • ZOSTER VACCINE (2 of 2) 03/03/2023   • INFLUENZA VACCINE  08/01/2023   • PAP SMEAR  03/02/2024   • MAMMOGRAM  04/12/2024   • TDAP/TD VACCINES (2 - Td or Tdap) 02/20/2027       The additional following portions of the patient's history were reviewed and updated as appropriate: allergies, current medications, past family history, past medical history, past social history, past surgical history and problem list.    Review of Systems   Constitutional: Negative.    HENT: Negative.    Eyes: Negative.    Respiratory: Negative.    Cardiovascular: Negative.    Gastrointestinal: Negative.    Endocrine: Negative.    Genitourinary: Negative.    Musculoskeletal: Negative.    Skin: Negative.    Allergic/Immunologic: Negative.    Neurological: Negative.    Hematological: Negative.    Psychiatric/Behavioral: Negative.          I have reviewed and agree with the HPI, ROS, and historical information as entered above. Crystal Bai MD       Objective   /60   Ht 160 cm (63\")   Wt 97.5 kg (215 lb)   LMP  (LMP Unknown) Comment: 2013  BMI 38.09 kg/m²     Physical Exam  Vitals and nursing note reviewed. Exam conducted with a chaperone present.   Constitutional:       Appearance: She is well-developed.   HENT:      Head: Normocephalic and atraumatic.   Eyes:      Pupils: Pupils are equal, round, and reactive to light.   Neck:      Thyroid: No thyroid mass or thyromegaly.   Pulmonary:      Effort: Pulmonary effort is normal. No retractions.   Chest:      Chest wall: No mass.   Breasts:     Right: Normal. No mass, nipple discharge, skin change or tenderness.      Left: Normal. No mass, nipple discharge, skin change or tenderness.   Abdominal:      General: Bowel sounds are normal.      Palpations: Abdomen is soft. Abdomen is not rigid. There is no mass.      " Tenderness: There is no abdominal tenderness. There is no guarding.      Hernia: No hernia is present. There is no hernia in the left inguinal area or right inguinal area.   Genitourinary:     Exam position: Lithotomy position.      Pubic Area: No rash.       Labia:         Right: No rash, tenderness or lesion.         Left: No rash, tenderness or lesion.       Urethra: No urethral pain or urethral swelling.      Vagina: Normal. No vaginal discharge or lesions.      Cervix: No cervical motion tenderness, discharge, lesion or cervical bleeding.      Uterus: Normal. Not enlarged, not fixed and not tender.       Adnexa:         Right: No mass, tenderness or fullness.          Left: No mass, tenderness or fullness.        Rectum: No external hemorrhoid.   Musculoskeletal:      Cervical back: Normal range of motion. No muscular tenderness.      Right lower leg: No edema.      Left lower leg: No edema.   Skin:     General: Skin is warm and dry.   Neurological:      Mental Status: She is alert and oriented to person, place, and time.      Motor: Motor function is intact.   Psychiatric:         Mood and Affect: Mood and affect normal.         Behavior: Behavior normal.            Assessment and Plan    Problem List Items Addressed This Visit    None  Visit Diagnoses     Personal history of breast cancer    -  Primary    Relevant Orders    LIQUID-BASED PAP SMEAR WITH HPV GENOTYPING IF ASCUS ([facility])    Well woman exam with routine gynecological exam        Relevant Orders    LIQUID-BASED PAP SMEAR WITH HPV GENOTYPING IF ASCUS ([facility])          1. GYN annual well woman exam.   2. Reviewed monthly self breast exams.  Instructed to call with lumps, pain, or breast discharge.  Yearly mammograms ordered.  3. Reviewed exercise as a preventative health measures.   4. Reviewed BMI and weight loss as preventative health measures.   5. Colonoscopy recommended.  6. Recommended Flu Vaccine in Fall of each year.  7. RTC in 1  year or PRN with problems.  Return in about 1 year (around 5/2/2024) for Annual physical.    Crystal Bai MD  05/02/2023

## 2023-05-03 LAB — REF LAB TEST METHOD: NORMAL

## 2023-06-05 ENCOUNTER — HOSPITAL ENCOUNTER (OUTPATIENT)
Dept: MAMMOGRAPHY | Facility: HOSPITAL | Age: 68
Discharge: HOME | End: 2023-06-05
Admitting: RADIOLOGY
Payer: MEDICARE

## 2023-06-05 DIAGNOSIS — R92.8 ABNORMAL MAMMOGRAM: ICD-10-CM

## 2023-06-05 PROCEDURE — 77065 DX MAMMO INCL CAD UNI: CPT

## 2023-06-05 PROCEDURE — 77065 DX MAMMO INCL CAD UNI: CPT | Performed by: RADIOLOGY

## 2023-08-10 ENCOUNTER — HOSPITAL ENCOUNTER (OUTPATIENT)
Dept: MAMMOGRAPHY | Facility: HOSPITAL | Age: 68
Discharge: HOME | End: 2023-08-10
Payer: MEDICARE

## 2023-08-10 DIAGNOSIS — R92.8 ABNORMAL MAMMOGRAM: ICD-10-CM

## 2023-08-10 PROCEDURE — A4648 IMPLANTABLE TISSUE MARKER: HCPCS

## 2023-08-10 PROCEDURE — 0 LIDOCAINE 1 % SOLUTION: Performed by: RADIOLOGY

## 2023-08-10 PROCEDURE — 88305 TISSUE EXAM BY PATHOLOGIST: CPT | Performed by: RADIOLOGY

## 2023-08-10 PROCEDURE — 76098 X-RAY EXAM SURGICAL SPECIMEN: CPT

## 2023-08-10 RX ORDER — LIDOCAINE HYDROCHLORIDE AND EPINEPHRINE 10; 10 MG/ML; UG/ML
10 INJECTION, SOLUTION INFILTRATION; PERINEURAL ONCE
Status: SHIPPED | OUTPATIENT
Start: 2023-08-10

## 2023-08-10 RX ORDER — LIDOCAINE HYDROCHLORIDE 10 MG/ML
5 INJECTION, SOLUTION INFILTRATION; PERINEURAL ONCE
Status: COMPLETED | OUTPATIENT
Start: 2023-08-10 | End: 2023-08-10

## 2023-08-10 RX ORDER — LIDOCAINE HYDROCHLORIDE 10 MG/ML
10 INJECTION, SOLUTION INFILTRATION; PERINEURAL ONCE
Status: COMPLETED | OUTPATIENT
Start: 2023-08-10 | End: 2023-08-10

## 2023-08-10 RX ADMIN — LIDOCAINE HYDROCHLORIDE 10 ML: 10 INJECTION, SOLUTION INFILTRATION; PERINEURAL at 11:11

## 2023-08-10 RX ADMIN — Medication 5 ML: at 11:03

## 2023-08-11 ENCOUNTER — TELEPHONE (OUTPATIENT)
Dept: MAMMOGRAPHY | Facility: HOSPITAL | Age: 68
End: 2023-08-11
Payer: MEDICARE

## 2023-08-11 LAB
CYTO UR: NORMAL
LAB AP CASE REPORT: NORMAL
LAB AP CLINICAL INFORMATION: NORMAL
LAB AP DIAGNOSIS COMMENT: NORMAL
PATH REPORT.FINAL DX SPEC: NORMAL
PATH REPORT.GROSS SPEC: NORMAL

## 2024-11-05 ENCOUNTER — TRANSCRIBE ORDERS (OUTPATIENT)
Dept: ADMINISTRATIVE | Facility: HOSPITAL | Age: 69
End: 2024-11-05
Payer: MEDICARE

## 2024-11-05 DIAGNOSIS — Z78.0 POSTMENOPAUSAL: Primary | ICD-10-CM

## 2025-03-31 DIAGNOSIS — N93.8 DYSFUNCTIONAL UTERINE BLEEDING: Primary | ICD-10-CM
